# Patient Record
Sex: FEMALE | Race: ASIAN | URBAN - METROPOLITAN AREA
[De-identification: names, ages, dates, MRNs, and addresses within clinical notes are randomized per-mention and may not be internally consistent; named-entity substitution may affect disease eponyms.]

---

## 2023-02-27 ENCOUNTER — OFFICE VISIT (OUTPATIENT)
Dept: NEUROLOGY | Age: 23
End: 2023-02-27
Payer: COMMERCIAL

## 2023-02-27 ENCOUNTER — DOCUMENTATION ONLY (OUTPATIENT)
Dept: NEUROLOGY | Age: 23
End: 2023-02-27

## 2023-02-27 VITALS
OXYGEN SATURATION: 98 % | WEIGHT: 118 LBS | SYSTOLIC BLOOD PRESSURE: 118 MMHG | DIASTOLIC BLOOD PRESSURE: 70 MMHG | HEIGHT: 61 IN | BODY MASS INDEX: 22.28 KG/M2 | HEART RATE: 92 BPM

## 2023-02-27 DIAGNOSIS — F41.1 GENERALIZED ANXIETY DISORDER: ICD-10-CM

## 2023-02-27 DIAGNOSIS — F40.11 GENERALIZED SOCIAL PHOBIA: Primary | ICD-10-CM

## 2023-02-27 PROCEDURE — 99204 OFFICE O/P NEW MOD 45 MIN: CPT | Performed by: PSYCHIATRY & NEUROLOGY

## 2023-02-27 NOTE — PROGRESS NOTES
NEUROLOGY  NEW PATIENT EVALUATION/CONSULTATION       PATIENT NAME: Kala Holden    MRN: [id number]    REASON FOR CONSULTATION: Head Movements    02/27/23      Previous records (physician notes, laboratory reports, and radiology reports) and imaging studies were reviewed and summarized. My recommendations will be communicated back to the patient's physician(s) via electronic medical record and/or by [address] mail. HISTORY OF PRESENT ILLNESS:  Kala Holden is a 25 y.o.  female presenting for evaluation of intermittent \"internal head movements\" since October occurring several times daily typically occurring when in class, working on group projects or at times when concentrating on her studies for a prolonged duration. She has recorded herself but does not note any external movement of her head. Denies current dizziness, lightheadedness or vertigo, however just prior to symptom onset she did note episodic postural dizziness when standing initially which has subsequently resolved. Current symptoms are very fleeting, lasting < 5 minutes typically. She states she only feels the movement internally. Denies head pain/pressure. She admits to a h/o migraines during childhood which have since resolved over 10 years ago. She denies any focal weakness, numbness, vision loss/alteration, twitching of extremities. She denies any excessive stimulant/caffeine intake. She was seen at a local urgent care for above symptoms with laboratory investigations which were unrevealing per patient. She feels symptoms may be stress/anxiety related due to social anxiety and stress related to school projects. Her anxiety has worsened since the pandemic. She is not currently followed for her anxiety. PAST MEDICAL HISTORY:  Migraines  Anxiety    PAST SURGICAL HISTORY:  History reviewed. No pertinent surgical history.     FAMILY HISTORY:   Family History   Problem Relation Age of Onset    Cancer Mother     Hypertension Mother     Diabetes Father     Migraines Paternal Grandmother          SOCIAL HISTORY:  Social History     Socioeconomic History    Marital status: SINGLE   Tobacco Use    Smoking status: Never    Smokeless tobacco: Never   Vaping Use    Vaping Use: Never used   Substance and Sexual Activity    Alcohol use: Yes     Comment: occasionally    Drug use: Never         MEDICATIONS:   None      ALLERGIES:  No Known Allergies      REVIEW OF SYSTEMS:  10 point ROS reviewed with patient. Please see scanned document under media. PHYSICAL EXAM:  Vital Signs: Visit Vitals  /70   Pulse 92   Ht 5' 1\" (1.549 m)   Wt 118 lb (53.5 kg)   SpO2 98%   BMI 22.30 kg/m²        General Medical Exam:  General:  Well appearing, comfortable, in no apparent distress. Eyes/ENT: see cranial nerve examination. Neck: No masses appreciated. Full range of motion without tenderness. Respiratory:  Clear to auscultation, good air entry bilaterally. Cardiac:  Regular rate and rhythm, no murmur. GI:  Soft, non-tender, non-distended abdomen. Bowel sounds normal. No masses, organomegaly. Extremities:  No deformities, edema, or skin discoloration. Skin:  No rashes or lesions. Neurological:  Mental Status:  Alert and oriented to person, place, and time with fluent speech. Cranial Nerves:   CNII/III/IV/VI: visual fields full to confrontation, EOMI, PERRL, no ptosis or nystagmus. CN V: Facial sensation intact bilaterally, masseter 5/5   CN VII: Facial muscles symmetric and strong   CN VIII: Hears finger rub well bilaterally, intact vestibular function   CN IX/X: Normal palatal movement   CN XI: Full strength shoulder shrug bilaterally   CN XII: Tongue protrusion full and midline without fasciculation or atrophy  Motor: Normal tone and muscle bulk with no pronator drift.    Individual muscle group testing:  Shoulder abduction:   Left:5/5   Right : 5/5    Shoulder adduction:   Left:5/5   Right : 5/5    Elbow flexion:      Left:5/5   Right : 5/5  Elbow extension:    Left:5/5   Right : 5/5   Wrist flexion:    Left:5/5   Right : 5/5  Wrist extension:    Left:5/5   Right : 5/5  Arm pronation:   Left:5/5   Right : 5/5  Arm supination:   Left:5/5   Right : 5/5    Finger flexion:    Left:5/5   Right : 5/5    Finger extension:   Left:5/5   Right : 5/5   Finger abduction:  Left:5/5   Right : 5/5   Finger adduction:   Left:5/5   Right : 5/5  Hip flexion:     Left:5/5   Right : 5/5         Hip extension:   Left:5/5   Right : 5/5    Knee flexion:    Left:5/5   Right : 5/5    Knee extension:   Left:5/5   Right : 5/5    Dorsiflexion:     Left:5/5   Right : 5/5  Plantar flexion:    Left:5/5   Right : 5/5      MSRs: No crossed adductors or clonus. RIGHT  LEFT   Brachioradialis 2+ 2+   Biceps 2+ 2+   Triceps 2+ 2+   Knee 2+ 2+   Achilles 2+ 2+        Plantar response Downward Downward          Sensation: Normal and symmetric perception of pinprick, temperature, light touch, proprioception, and vibration; Coordination: No dysmetria. Normal rapid alternating movements; finger-to-nose and heel-to- shin testing are within normal limits. Gait: Normal native gait. ASSESSMENT:      ICD-10-CM ICD-9-CM    1. Generalized social phobia  F40.11 300.23 REFERRAL TO PSYCHIATRY      REFERRAL TO PSYCHOLOGY      2. Generalized anxiety disorder  F41.1 300.02 REFERRAL TO PSYCHIATRY      REFERRAL TO PSYCHOLOGY      25year old female presenting for evaluation of unusual internal head movement not visible to external observers since October typically occurring when in class, working on group projects or concentrating on her studies for prolonged periods of time. Symptoms do appears triggered by social as well as general anxiety and related stressors, worse since the pandemic per patient. She denies any focal deficits, headaches, accompanying dizziness/vertigo. Neurological examination is non-focal and essentially normal today with low suspicion for primary neurologic etiology.  I have provided her with referral to Psychiatry as well as Psychology to address her uncontrolled anxiety disorder with the hope that control of mood disorders may result in symptomatic improvement. We discussed return to clinic if any new/worsening neurologic symptoms. PLAN:  Referral to Psychiatry/Psychology      Follow-up and Dispositions    Return if symptoms worsen or fail to improve. I have discussed the diagnosis with the patient today and the intended plan as seen in the above orders with both the patient as well as referring provider and/or PCP via electronic correspondence. The patient has received an after-visit summary and questions were answered concerning future plans. Fabiene Seip Merilyn Face, DO  Staff Neurologist  Diplomate, [facility] [employer] Neu[employer]

## 2023-02-27 NOTE — PROGRESS NOTES
Faxed referrals for Psychiatry and Psychology to [facility] received confirmation they were sent successfully